# Patient Record
Sex: MALE | Race: WHITE | URBAN - METROPOLITAN AREA
[De-identification: names, ages, dates, MRNs, and addresses within clinical notes are randomized per-mention and may not be internally consistent; named-entity substitution may affect disease eponyms.]

---

## 2022-06-10 ENCOUNTER — OFFICE VISIT (OUTPATIENT)
Dept: URBAN - METROPOLITAN AREA CLINIC 82 | Facility: CLINIC | Age: 62
End: 2022-06-10
Payer: COMMERCIAL

## 2022-06-10 DIAGNOSIS — H52.4 PRESBYOPIA: Primary | ICD-10-CM

## 2022-06-10 PROCEDURE — 92082 INTERMEDIATE VISUAL FIELD XM: CPT | Performed by: OPTOMETRIST

## 2022-06-10 PROCEDURE — 92004 COMPRE OPH EXAM NEW PT 1/>: CPT | Performed by: OPTOMETRIST

## 2022-06-10 ASSESSMENT — INTRAOCULAR PRESSURE
OS: 22
OD: 20

## 2022-06-10 ASSESSMENT — KERATOMETRY
OS: 43.50
OD: 43.13

## 2022-06-10 ASSESSMENT — VISUAL ACUITY
OS: 20/40
OD: 20/20

## 2022-06-10 NOTE — IMPRESSION/PLAN
Impression: Presbyopia: H52.4. Plan: No glasses RX given today. OTC readers per patient. Diabetes type II: no background retinopathy. Discussed ocular and systemic benefits of blood sugar and blood pressure control. Call the office if any changes in vision status or ocular symptoms occur. Discussed diagnosis in detail with patient. No treatment currently recommended. The patient will monitor vision changes and contact us with any decrease in vision.